# Patient Record
Sex: MALE | Race: WHITE | NOT HISPANIC OR LATINO | Employment: FULL TIME | URBAN - NONMETROPOLITAN AREA
[De-identification: names, ages, dates, MRNs, and addresses within clinical notes are randomized per-mention and may not be internally consistent; named-entity substitution may affect disease eponyms.]

---

## 2019-09-27 ENCOUNTER — ANESTHESIA (OUTPATIENT)
Dept: PERIOP | Facility: HOSPITAL | Age: 44
End: 2019-09-27

## 2019-09-27 ENCOUNTER — HOSPITAL ENCOUNTER (OUTPATIENT)
Facility: HOSPITAL | Age: 44
Setting detail: HOSPITAL OUTPATIENT SURGERY
Discharge: HOME | End: 2019-09-27
Attending: SURGERY | Admitting: SURGERY

## 2019-09-27 ENCOUNTER — OFFICE VISIT (OUTPATIENT)
Dept: SURGERY | Facility: CLINIC | Age: 44
End: 2019-09-27

## 2019-09-27 ENCOUNTER — ANESTHESIA EVENT (OUTPATIENT)
Dept: PERIOP | Facility: HOSPITAL | Age: 44
End: 2019-09-27

## 2019-09-27 VITALS
HEIGHT: 67 IN | WEIGHT: 223.55 LBS | DIASTOLIC BLOOD PRESSURE: 72 MMHG | SYSTOLIC BLOOD PRESSURE: 131 MMHG | TEMPERATURE: 97.5 F | HEART RATE: 99 BPM | RESPIRATION RATE: 18 BRPM | BODY MASS INDEX: 35.09 KG/M2 | OXYGEN SATURATION: 95 %

## 2019-09-27 VITALS
HEIGHT: 68 IN | BODY MASS INDEX: 33.95 KG/M2 | DIASTOLIC BLOOD PRESSURE: 80 MMHG | TEMPERATURE: 97.9 F | SYSTOLIC BLOOD PRESSURE: 130 MMHG | WEIGHT: 224 LBS

## 2019-09-27 DIAGNOSIS — L02.211 ABSCESS OF ABDOMINAL WALL: Primary | ICD-10-CM

## 2019-09-27 DIAGNOSIS — L02.211 ABSCESS OF ABDOMINAL WALL: ICD-10-CM

## 2019-09-27 LAB
ANION GAP SERPL CALCULATED.3IONS-SCNC: 11 MMOL/L (ref 5–15)
BUN BLD-MCNC: 16 MG/DL (ref 6–20)
BUN/CREAT SERPL: 19.3 (ref 7–25)
CALCIUM SPEC-SCNC: 9.2 MG/DL (ref 8.6–10.5)
CHLORIDE SERPL-SCNC: 103 MMOL/L (ref 98–107)
CO2 SERPL-SCNC: 25 MMOL/L (ref 22–29)
CREAT BLD-MCNC: 0.83 MG/DL (ref 0.76–1.27)
GFR SERPL CREATININE-BSD FRML MDRD: 100 ML/MIN/1.73
GLUCOSE BLD-MCNC: 158 MG/DL (ref 65–99)
GLUCOSE BLDC GLUCOMTR-MCNC: 146 MG/DL (ref 70–130)
POTASSIUM BLD-SCNC: 4 MMOL/L (ref 3.5–5.2)
SODIUM BLD-SCNC: 139 MMOL/L (ref 136–145)

## 2019-09-27 PROCEDURE — 87070 CULTURE OTHR SPECIMN AEROBIC: CPT | Performed by: SURGERY

## 2019-09-27 PROCEDURE — 88304 TISSUE EXAM BY PATHOLOGIST: CPT | Performed by: SURGERY

## 2019-09-27 PROCEDURE — 87205 SMEAR GRAM STAIN: CPT | Performed by: SURGERY

## 2019-09-27 PROCEDURE — 82962 GLUCOSE BLOOD TEST: CPT

## 2019-09-27 PROCEDURE — 93010 ELECTROCARDIOGRAM REPORT: CPT | Performed by: INTERNAL MEDICINE

## 2019-09-27 PROCEDURE — 11403 EXC TR-EXT B9+MARG 2.1-3CM: CPT | Performed by: SURGERY

## 2019-09-27 PROCEDURE — 93005 ELECTROCARDIOGRAM TRACING: CPT | Performed by: ANESTHESIOLOGY

## 2019-09-27 PROCEDURE — 25010000002 PROPOFOL 10 MG/ML EMULSION: Performed by: NURSE ANESTHETIST, CERTIFIED REGISTERED

## 2019-09-27 PROCEDURE — 80048 BASIC METABOLIC PNL TOTAL CA: CPT | Performed by: ANESTHESIOLOGY

## 2019-09-27 PROCEDURE — 88304 TISSUE EXAM BY PATHOLOGIST: CPT | Performed by: PATHOLOGY

## 2019-09-27 PROCEDURE — 99202 OFFICE O/P NEW SF 15 MIN: CPT | Performed by: SURGERY

## 2019-09-27 RX ORDER — MAGNESIUM HYDROXIDE 1200 MG/15ML
LIQUID ORAL AS NEEDED
Status: DISCONTINUED | OUTPATIENT
Start: 2019-09-27 | End: 2019-09-27 | Stop reason: HOSPADM

## 2019-09-27 RX ORDER — CEPHALEXIN 500 MG/1
500 CAPSULE ORAL 3 TIMES DAILY
COMMUNITY
Start: 2019-09-26 | End: 2019-10-07

## 2019-09-27 RX ORDER — PROPOFOL 10 MG/ML
VIAL (ML) INTRAVENOUS AS NEEDED
Status: DISCONTINUED | OUTPATIENT
Start: 2019-09-27 | End: 2019-09-27 | Stop reason: SURG

## 2019-09-27 RX ORDER — LIDOCAINE HYDROCHLORIDE AND EPINEPHRINE BITARTRATE 20; .01 MG/ML; MG/ML
INJECTION, SOLUTION SUBCUTANEOUS AS NEEDED
Status: DISCONTINUED | OUTPATIENT
Start: 2019-09-27 | End: 2019-09-27 | Stop reason: HOSPADM

## 2019-09-27 RX ORDER — BUPIVACAINE HCL/0.9 % NACL/PF 0.1 %
2 PLASTIC BAG, INJECTION (ML) EPIDURAL ONCE
Status: DISCONTINUED | OUTPATIENT
Start: 2019-09-27 | End: 2019-09-27 | Stop reason: HOSPADM

## 2019-09-27 RX ORDER — SODIUM CHLORIDE 9 MG/ML
100 INJECTION, SOLUTION INTRAVENOUS CONTINUOUS
Status: CANCELLED | OUTPATIENT
Start: 2019-09-27

## 2019-09-27 RX ORDER — ATORVASTATIN CALCIUM 40 MG/1
40 TABLET, FILM COATED ORAL NIGHTLY
COMMUNITY
Start: 2019-09-16 | End: 2020-10-23

## 2019-09-27 RX ORDER — LISINOPRIL 10 MG/1
10 TABLET ORAL DAILY
COMMUNITY
Start: 2019-09-16 | End: 2020-09-16

## 2019-09-27 RX ORDER — METFORMIN HYDROCHLORIDE 500 MG/1
500 TABLET, EXTENDED RELEASE ORAL
COMMUNITY
Start: 2019-09-17

## 2019-09-27 RX ORDER — SODIUM CHLORIDE 9 MG/ML
100 INJECTION, SOLUTION INTRAVENOUS CONTINUOUS
Status: DISCONTINUED | OUTPATIENT
Start: 2019-09-27 | End: 2019-09-27 | Stop reason: HOSPADM

## 2019-09-27 RX ORDER — BUPIVACAINE HYDROCHLORIDE 2.5 MG/ML
INJECTION, SOLUTION EPIDURAL; INFILTRATION; INTRACAUDAL AS NEEDED
Status: DISCONTINUED | OUTPATIENT
Start: 2019-09-27 | End: 2019-09-27 | Stop reason: HOSPADM

## 2019-09-27 RX ORDER — LIDOCAINE HYDROCHLORIDE 20 MG/ML
INJECTION, SOLUTION EPIDURAL; INFILTRATION; INTRACAUDAL; PERINEURAL AS NEEDED
Status: DISCONTINUED | OUTPATIENT
Start: 2019-09-27 | End: 2019-09-27 | Stop reason: SURG

## 2019-09-27 RX ORDER — HYDROCODONE BITARTRATE AND ACETAMINOPHEN 5; 325 MG/1; MG/1
1 TABLET ORAL EVERY 6 HOURS PRN
Qty: 20 TABLET | Refills: 0 | Status: SHIPPED | OUTPATIENT
Start: 2019-09-27 | End: 2020-10-23

## 2019-09-27 RX ORDER — CLOPIDOGREL BISULFATE 75 MG/1
75 TABLET ORAL DAILY
COMMUNITY
Start: 2019-09-16

## 2019-09-27 RX ADMIN — SODIUM CHLORIDE 100 ML/HR: 900 INJECTION, SOLUTION INTRAVENOUS at 15:48

## 2019-09-27 RX ADMIN — PROPOFOL 50 MG: 10 INJECTION, EMULSION INTRAVENOUS at 17:06

## 2019-09-27 RX ADMIN — PROPOFOL 50 MG: 10 INJECTION, EMULSION INTRAVENOUS at 16:50

## 2019-09-27 RX ADMIN — PROPOFOL 20 MG: 10 INJECTION, EMULSION INTRAVENOUS at 16:59

## 2019-09-27 RX ADMIN — PROPOFOL 50 MG: 10 INJECTION, EMULSION INTRAVENOUS at 17:02

## 2019-09-27 RX ADMIN — PROPOFOL 30 MG: 10 INJECTION, EMULSION INTRAVENOUS at 17:09

## 2019-09-27 RX ADMIN — LIDOCAINE HYDROCHLORIDE 100 MG: 20 INJECTION, SOLUTION EPIDURAL; INFILTRATION; INTRACAUDAL at 16:50

## 2019-09-27 RX ADMIN — SODIUM CHLORIDE: 900 INJECTION, SOLUTION INTRAVENOUS at 16:38

## 2019-09-27 NOTE — PROGRESS NOTES
"CHIEF COMPLAINT:    Abscess on abdominal wall    HISTORY OF PRESENT ILLNESS:    Yemi Fraga is a 44 y.o. male who presents with an abscess on his lower abdominal wall which has been present and worsening for approximately 1 week despite initiation of oral antibiotics previously.  He states that he has had small cutaneous \"pimples \"previously on his legs and abdominal wall and that he is always been able to pop them and they would go away.  He believes the spot began in that way about a week ago.  He tried to pop it.  It has had some spontaneous drainage but has worsened since he initially noticed it.  He has had constant pain in the area.    pertinent past medical history: diabetes, heart disease including prior MI, HLD    History reviewed. No pertinent surgical history.    Prior to Admission medications    Medication Sig Start Date End Date Taking? Authorizing Provider   atorvastatin (LIPITOR) 40 MG tablet  9/16/19  Yes Emilia Flores MD   cephalexin (KEFLEX) 500 MG capsule Take 500 mg by mouth 3 (Three) Times a Day. 9/26/19 10/7/19 Yes Emilia Flores MD   clopidogrel (PLAVIX) 75 MG tablet  9/16/19  Yes ProviderEmilia MD   lisinopril (PRINIVIL,ZESTRIL) 10 MG tablet Take 10 mg by mouth Daily. 9/16/19 9/16/20 Yes Emilia Flores MD   metFORMIN ER (GLUCOPHAGE-XR) 500 MG 24 hr tablet  9/17/19  Yes ProviderEmilia MD       No Known Allergies    History reviewed. No pertinent family history.    Social History     Socioeconomic History   • Marital status:      Spouse name: Not on file   • Number of children: Not on file   • Years of education: Not on file   • Highest education level: Not on file   Tobacco Use   • Smoking status: Current Every Day Smoker   • Smokeless tobacco: Never Used   Substance and Sexual Activity   • Alcohol use: No     Frequency: Never       Review of Systems   Constitutional: Negative for activity change, appetite change, chills and fever.   HENT: Negative " "for hearing loss, nosebleeds and trouble swallowing.    Cardiovascular: Negative for chest pain, palpitations and leg swelling.   Gastrointestinal: Negative for abdominal distention, abdominal pain, anal bleeding, blood in stool, constipation, diarrhea, nausea, rectal pain and vomiting.   Endocrine: Negative for cold intolerance, heat intolerance, polydipsia and polyuria.   Genitourinary: Positive for frequency. Negative for decreased urine volume, difficulty urinating, dysuria, enuresis, hematuria and urgency.   Musculoskeletal: Positive for arthralgias and back pain. Negative for gait problem, myalgias and neck pain.   Skin: Positive for wound. Negative for pallor and rash.   Allergic/Immunologic: Negative for immunocompromised state.   Neurological: Negative for dizziness, seizures, weakness, light-headedness, numbness and headaches.   Psychiatric/Behavioral: Negative for agitation and behavioral problems. The patient is not nervous/anxious.        Objective     /80   Temp 97.9 °F (36.6 °C) (Oral)   Ht 172.7 cm (68\")   Wt 102 kg (224 lb)   BMI 34.06 kg/m²     Physical Exam   Constitutional: He is oriented to person, place, and time. He appears well-developed and well-nourished.   HENT:   Head: Normocephalic and atraumatic.   Nose: Nose normal.   Eyes: Conjunctivae and EOM are normal. Right eye exhibits no discharge. Left eye exhibits no discharge.   Neck: Trachea normal, normal range of motion and phonation normal. Neck supple. No JVD present. No tracheal deviation and no edema present. No thyromegaly present.   Cardiovascular: Normal rate, regular rhythm and normal heart sounds. Exam reveals no gallop and no friction rub.   No murmur heard.  Pulmonary/Chest: Effort normal and breath sounds normal. No accessory muscle usage. No respiratory distress. He has no decreased breath sounds. He has no wheezes. He has no rales. He exhibits no tenderness.   Abdominal: Soft. He exhibits no distension, no fluid " wave, no ascites, no pulsatile midline mass and no mass. There is no tenderness. There is no rebound and no guarding. No hernia.       Musculoskeletal: Normal range of motion. He exhibits no edema, tenderness or deformity.   Lymphadenopathy:     He has no cervical adenopathy.        Left: No supraclavicular adenopathy present.   Neurological: He is alert and oriented to person, place, and time. He has normal strength. No cranial nerve deficit.   Skin: Skin is warm and dry. No rash noted. He is not diaphoretic. No erythema. No pallor.   Psychiatric: He has a normal mood and affect. His speech is normal and behavior is normal. Judgment and thought content normal. Cognition and memory are normal.   Vitals reviewed.        ASSESSMENT:    Abscess of abdominal wall    PLAN:    Owing to the size of the abscess as well as the surrounding cellulitis I believe the patient needs to have operative drainage of this area.  The risks and benefits of incision and drainage of abdominal wall abscess were discussed and he is agreeable to proceeding.  We will plan for OR this afternoon.  I discussed with him that given his diabetes and the amount of cellulitis he has he may need to be admitted to the hospital afterwards for a course of IV antibiotics as well.  We will make this determination following surgery.          This document has been electronically signed by Khris Crum MD on September 27, 2019 2:55 PM

## 2019-09-27 NOTE — ANESTHESIA POSTPROCEDURE EVALUATION
Patient: Yemi Fraga    Procedure Summary     Date:  09/27/19 Room / Location:  [facility] OR 09 / [facility] OR    Anesthesia Start:  1649 Anesthesia Stop:  1716    Procedure:  EXCISION of abdominal wall CYST (N/A ) Diagnosis:       Abscess of abdominal wall      (Abscess of abdominal wall [L02.211])    Surgeon:  Khris Crum MD Provider:  Saúl Perez MD    Anesthesia Type:  general ASA Status:  3 - Emergent          Anesthesia Type: general  Last vitals  BP   146/70 (09/27/19 1520)   Temp   97.6 °F (36.4 °C) (09/27/19 1520)   Pulse   75 (09/27/19 1520)   Resp   20 (09/27/19 1520)     SpO2   97 % (09/27/19 1520)     Post Anesthesia Care and Evaluation    Patient location during evaluation: bedside  Patient participation: complete - patient participated  Pain management: adequate  Airway patency: patent  Anesthetic complications: No anesthetic complications  PONV Status: none  Cardiovascular status: acceptable  Respiratory status: acceptable  Hydration status: acceptable

## 2019-09-27 NOTE — ANESTHESIA PREPROCEDURE EVALUATION
" Anesthesia Evaluation     Patient summary reviewed and Nursing notes reviewed   no history of anesthetic complications:  NPO Solid Status: Waived due to emergency  NPO Liquid Status: Waived due to emergency           Airway   Mallampati: III  TM distance: >3 FB  Neck ROM: full  possible difficult intubation  Dental    (+) poor dentation    Comment: Remaining upper and lower dentition in hopeless repair. Carious,chipped,cracked,missing;states none are \"loose\". Advised of potential loss and injury.    Pulmonary     breath sounds clear to auscultation  (+) a smoker Current Smoked day of surgery, COPD mild, decreased breath sounds,   Cardiovascular - normal exam    ECG reviewed  PT is on anticoagulation therapy  Rhythm: regular  Rate: normal    (+) hypertension, hyperlipidemia,   (-) murmur    ROS comment: EKG:NSR    Neuro/Psych- negative ROS  GI/Hepatic/Renal/Endo    (+) obesity,   diabetes mellitus type 2 poorly controlled,     Musculoskeletal (-) negative ROS    Abdominal   (+) obese,    Substance History - negative use     OB/GYN negative ob/gyn ROS         Other - negative ROS                       Anesthesia Plan    ASA 3 - emergent     MAC and general   total IV anesthesia and Rapid sequence  intravenous induction   Anesthetic plan, all risks, benefits, and alternatives have been provided, discussed and informed consent has been obtained with: patient and spouse/significant other.      "

## 2019-09-30 LAB
BACTERIA SPEC AEROBE CULT: NORMAL
GRAM STN SPEC: NORMAL

## 2019-10-01 DIAGNOSIS — Z00.00 GENERAL MEDICAL EXAM: Primary | ICD-10-CM

## 2019-10-01 LAB
LAB AP CASE REPORT: NORMAL
PATH REPORT.FINAL DX SPEC: NORMAL
PATH REPORT.GROSS SPEC: NORMAL

## 2019-10-02 ENCOUNTER — OFFICE VISIT (OUTPATIENT)
Dept: CARDIOLOGY | Facility: CLINIC | Age: 44
End: 2019-10-02

## 2019-10-02 VITALS
HEART RATE: 94 BPM | WEIGHT: 225 LBS | BODY MASS INDEX: 35.31 KG/M2 | DIASTOLIC BLOOD PRESSURE: 60 MMHG | SYSTOLIC BLOOD PRESSURE: 120 MMHG | OXYGEN SATURATION: 99 % | HEIGHT: 67 IN

## 2019-10-02 DIAGNOSIS — Z72.0 TOBACCO USE: ICD-10-CM

## 2019-10-02 DIAGNOSIS — I25.2 HISTORY OF ACUTE INFERIOR WALL MI: Primary | ICD-10-CM

## 2019-10-02 DIAGNOSIS — I25.10 CORONARY ARTERY DISEASE INVOLVING NATIVE CORONARY ARTERY OF NATIVE HEART WITHOUT ANGINA PECTORIS: ICD-10-CM

## 2019-10-02 DIAGNOSIS — E78.2 MIXED HYPERLIPIDEMIA: ICD-10-CM

## 2019-10-02 DIAGNOSIS — I10 ESSENTIAL HYPERTENSION: ICD-10-CM

## 2019-10-02 PROCEDURE — 93000 ELECTROCARDIOGRAM COMPLETE: CPT | Performed by: INTERNAL MEDICINE

## 2019-10-02 PROCEDURE — 99244 OFF/OP CNSLTJ NEW/EST MOD 40: CPT | Performed by: INTERNAL MEDICINE

## 2019-10-02 RX ORDER — ASPIRIN 81 MG/1
81 TABLET ORAL DAILY
COMMUNITY

## 2019-10-02 RX ORDER — LANCETS 33 GAUGE
EACH MISCELLANEOUS
COMMUNITY
Start: 2018-10-12

## 2019-10-02 NOTE — PROGRESS NOTES
Yemi Fraga  44 y.o. male    10/02/2019  1. History of acute inferior wall MI    2. Tobacco use    3. Coronary artery disease involving native coronary artery of native heart without angina pectoris    4. Essential hypertension    5. Mixed hyperlipidemia        History of Present Illness  Mr. 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  He was referred by his primary care physician.  His history is remarkable for known coronary artery disease and back in January 2014 underwent PCI to right coronary artery in an acute MI setting in [place], [place].  He had to LIMA placed at that time.  He was noted to have noncritical disease in the left anterior descending coronary artery, ramus intermedius coronary artery.  His risk factors for coronary artery disease include long-standing tobacco abuse, diabetes mellitus, hypertension, hyperlipidemia.  There is no significant family history of CAD.  The patient has done reasonably well since his intervention and has been compliant with his medications.  His blood pressure was in the normal range.  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  EKG today showed sinus rhythm with heart rate of 94 bpm.  Mild IVCD.  Old inferior wall myocardial infarction.  No signs of congestive heart failure was noted.    He recently underwent surgery for an abscess and abdominal wall sebaceous cyst which was successfully drained.    SUBJECTIVE    No Known Allergies      Past Medical History:   Diagnosis Date   • Diabetes mellitus (CMS/HCC)    • Hyperlipidemia    • Hypertension    • Myocardial infarction (CMS/HCC)     2014         Past Surgical History:   Procedure Laterality Date   • CARDIAC CATHETERIZATION      stent x 2         History reviewed. No pertinent family history.      Social History     Socioeconomic History  "  • Marital status:      Spouse name: Not on file   • Number of children: Not on file   • Years of education: Not on file   • Highest education level: Not on file   Tobacco Use   • Smoking status: Current Every Day Smoker     Packs/day: 2.00     Years: 30.00     Pack years: 60.00   • Smokeless tobacco: Never Used   Substance and Sexual Activity   • Alcohol use: No     Frequency: Never   • Drug use: No         Current Outpatient Medications   Medication Sig Dispense Refill   • aspirin 81 MG EC tablet Take 81 mg by mouth Daily.     • atorvastatin (LIPITOR) 40 MG tablet Take 40 mg by mouth Every Night.     • cephalexin (KEFLEX) 500 MG capsule Take 500 mg by mouth 3 (Three) Times a Day.     • clopidogrel (PLAVIX) 75 MG tablet Take 75 mg by mouth Daily.     • HYDROcodone-acetaminophen (NORCO) 5-325 MG per tablet Take 1 tablet by mouth Every 6 (Six) Hours As Needed (Pain). 20 tablet 0   • lisinopril (PRINIVIL,ZESTRIL) 10 MG tablet Take 10 mg by mouth Daily.     • metFORMIN ER (GLUCOPHAGE-XR) 500 MG 24 hr tablet Take 500 mg by mouth Daily With Breakfast.     • ONETOUCH DELICA LANCETS 33G misc        No current facility-administered medications for this visit.          OBJECTIVE    /60   Pulse 94   Ht 170.2 cm (67.01\")   Wt 102 kg (225 lb)   SpO2 99%   BMI 35.23 kg/m²         Review of Systems     Constitutional:  Denies recent weight loss, weight gain, fever or chills, no change in exercise tolerance     HENT:  Denies any hearing loss, epistaxis, hoarseness, or difficulty speaking.     Eyes: Wears eyeglasses or contact lenses     Respiratory: Occasional dyspnea with exertion,no cough, wheezing, or hemoptysis.     Cardiovascular: Negative for palpations, chest pain, orthopnea, PND, peripheral edema, syncope, or claudication.     Gastrointestinal:  Denies change in bowel habits, dyspepsia, ulcer disease, hematochezia, or melena.     Endocrine: Negative for cold intolerance, heat intolerance, polydipsia, " polyphagia and polyuria. Denies any history of weight change, heat/cold intolerance, polydipsia, polyuria     Genitourinary: Negative.      Musculoskeletal: Denies any history of arthritic symptoms or back problems     Skin:  Denies any change in hair or nails, rashes, or skin lesions. Recent surgery for infected sebaceous cyst    Allergic/Immunologic: Negative.  Negative for environmental allergies, food allergies and immunocompromised state.     Neurological:  Denies any history of recurrent headaches, strokes, TIA, or seizure disorder.     Hematological: Denies any food allergies, seasonal allergies, bleeding disorders, or lymphadenopathy.     Psychiatric/Behavioral: Denies any history of depression, substance abuse, or change in cognitive function.         Physical Exam     Constitutional: Cooperative, alert and oriented,  in no acute distress.     HENT:   Head: Normocephalic, normal hair patterns, no masses or tenderness.  Ears, Nose, and Throat: No gross abnormalities. No pallor or cyanosis.   Eyes: EOMS intact, PERRL, conjunctivae and lids unremarkable. Fundoscopic exam and visual fields not performed.   Neck: No palpable masses or adenopathy, no thyromegaly, no JVD, carotid pulses are full and equal bilaterally and without  Bruits.     Cardiovascular: Regular rhythm, S1 and S2 normal, no S3 or S4.No murmurs, gallops, or rubs detected.     Pulmonary/Chest: Chest: normal symmetry, normal respiratory excursion, no intercostal retraction, no use of accessory muscles.            Pulmonary: Normal breath sounds. No rales or ronchi.    Abdominal: Abdomen soft, bowel sounds normoactive, no masses, no hepatosplenomegaly, non-tender, no bruits.     Musculoskeletal: No deformities, clubbing, cyanosis, erythema, or edema observed. There are no spinal abnormalities noted. Normal muscle strength and tone. Pulses full and equal in all extremities, no bruits auscultated.     Neurological: No gross motor or sensory deficits  noted, affect appropriate, oriented to time, person, place.     Skin: Warm and dry to the touch, no apparent skin lesions or masses noted.     Psychiatric: He has a normal mood and affect. His behavior is normal. Judgment and thought content normal.         Procedures      Lab Results   Component Value Date    WBC 10.4 09/16/2019    HGB 17 (H) 09/16/2019    HCT 50.8 (H) 09/16/2019    MCV 91 09/16/2019     09/16/2019     Lab Results   Component Value Date    GLUCOSE 158 (H) 09/27/2019    BUN 16 09/27/2019    CREATININE 0.83 09/27/2019    EGFRIFNONA 100 09/27/2019    BCR 19.3 09/27/2019    CO2 25.0 09/27/2019    CALCIUM 9.2 09/27/2019     Lab Results   Component Value Date    CHOL 197 09/16/2019    CHOL 372 (H) 10/01/2018     Lab Results   Component Value Date    TRIG 479 (H) 09/16/2019    TRIG 1,799 (H) 10/01/2018     Lab Results   Component Value Date    HDL 18 (L) 09/16/2019    HDL 16 (L) 10/01/2018     No components found for: LDLCALC  Lab Results   Component Value Date    LDL 68 09/16/2019    LDL 90 10/01/2018     No results found for: HDLLDLRATIO  No components found for: CHOLHDL  Lab Results   Component Value Date    HGBA1C 6.5 (H) 09/16/2019     Lab Results   Component Value Date    TSH 1.39 10/01/2018           ASSESSMENT AND PLAN  Mr. Fraga has multiple risk factors for coronary artery disease including hypertension, diabetes mellitus, tobacco abuse and documented coronary artery disease with previous intervention to right coronary artery in an acute MI setting in January 2014.  He is here for a DOT physical.  He has mild NYHA class II dyspnea on exertion with no PND or orthopnea.  No chest pain is reported.  The plan will be to proceed with an exercise Cardiolite stress test to assess myocardial perfusion and an echocardiogram to assess left ventricular and valvular function has been arranged.  Antiplatelet therapy with aspirin and Plavix, lipid-lowering therapy with atorvastatin, antihypertensive  therapy with lisinopril has been continued.  Aggressive risk factor modification and smoking cessation was stressed.    Addendum on 10/15/2019  Echocardiogram showed:  · The left ventricular cavity is borderline dilated.  · Left ventricular wall thickness is consistent with mild-to-moderate concentric hypertrophy.  · Estimated EF = 48%. Mild Inferior wall hypokinesis  · Left ventricular systolic function is low normal.  · Left ventricular diastolic dysfunction (grade I) consistent with impaired relaxation.     Exercise Cardiolite stress test showed:  · Patient exercised for 7 minutes and 15 seconds of the [place] protocol to achieve a maximum workload of 8.( METS). No chest pain reported during the test. No ST-T wave changes diagnostic of ischemia. Accelerated blood pressure response. Patient achieved 93% of maximal age-predicted heart rate.  · Left ventricular ejection fraction is mildly reduced (Calculated EF = 45%).  · Myocardial perfusion imaging indicates a small-to-medium-sized infarct located in the inferior wall and lateral wall with no significant ischemia noted.     Mr. 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    Yemi was seen today for new patient.    Diagnoses and all orders for this visit:    History of acute inferior wall MI  -     Adult Transthoracic Echo Complete W/ Cont if Necessary Per Protocol; Future  -     Stress Test With Myocardial Perfusion One Day; Future    Tobacco use    Coronary artery disease involving native coronary artery of native heart without angina pectoris  -     Adult Transthoracic Echo Complete W/ Cont if Necessary Per Protocol; Future  -     Stress Test With Myocardial Perfusion One Day; Future    Essential hypertension  -     Adult Transthoracic Echo Complete W/ Cont if Necessary Per Protocol; Future  -     Stress Test With Myocardial Perfusion One Day; Future    Mixed hyperlipidemia  -      Adult Transthoracic Echo Complete W/ Cont if Necessary Per Protocol; Future  -     Stress Test With Myocardial Perfusion One Day; Future        Patient's Body mass index is 35.23 kg/m². BMI is above normal parameters. Recommendations include: exercise counseling and nutrition counseling.      Yemi Fraga is a current cigarettes user.  He currently smokes 1 pack of cigarettes per day for a duration of 35 years. I have educated him on the risk of diseases from using tobacco products such as cancer, COPD and heart diease.     I spent 3  minutes counseling the patient.          Ruben Alvarez MD  10/2/2019  9:10 AM

## 2019-10-03 ENCOUNTER — OFFICE VISIT (OUTPATIENT)
Dept: SURGERY | Facility: CLINIC | Age: 44
End: 2019-10-03

## 2019-10-03 VITALS
TEMPERATURE: 97.5 F | WEIGHT: 225.4 LBS | HEIGHT: 67 IN | BODY MASS INDEX: 35.38 KG/M2 | DIASTOLIC BLOOD PRESSURE: 70 MMHG | HEART RATE: 97 BPM | SYSTOLIC BLOOD PRESSURE: 136 MMHG

## 2019-10-03 DIAGNOSIS — Z09 FOLLOW UP: Primary | ICD-10-CM

## 2019-10-03 PROCEDURE — 99024 POSTOP FOLLOW-UP VISIT: CPT | Performed by: SURGERY

## 2019-10-03 NOTE — PROGRESS NOTES
CHIEF COMPLAINT:    Chief Complaint   Patient presents with   • Post-op Follow-up     P.O. Exc. of Infected abd. wall sebaceous cyst 9-27-19.       HISTORY OF PRESENT ILLNESS:    Yemi Fraga is a 44 y.o. male who underwent excision of an infected sebaceous cyst from the abdominal wall on 9/27/2019.  He returns today for follow-up.  He has no complaints.  His wife has been doing his dressing changes at home.    EXAM:  Vitals:    10/03/19 1401   BP: 136/70   Pulse: 97   Temp: 97.5 °F (36.4 °C)         Healing wound in right lower abdominal wall.  Wet-to-dry dressing placed today.    ASSESSMENT:    Status post excision of infected cyst from abdominal wall    PLAN:    Overall appears to be healing appropriately.  Continue local wound care.  Follow-up in 2 weeks.          This document has been electronically signed by Khris Crum MD on October 3, 2019 2:30 PM

## 2019-10-09 ENCOUNTER — HOSPITAL ENCOUNTER (OUTPATIENT)
Dept: NUCLEAR MEDICINE | Facility: HOSPITAL | Age: 44
Discharge: HOME | End: 2019-10-09

## 2019-10-09 ENCOUNTER — HOSPITAL ENCOUNTER (OUTPATIENT)
Dept: CARDIOLOGY | Facility: HOSPITAL | Age: 44
Discharge: HOME | End: 2019-10-09

## 2019-10-09 DIAGNOSIS — I10 ESSENTIAL HYPERTENSION: ICD-10-CM

## 2019-10-09 DIAGNOSIS — I25.2 HISTORY OF ACUTE INFERIOR WALL MI: ICD-10-CM

## 2019-10-09 DIAGNOSIS — E78.2 MIXED HYPERLIPIDEMIA: ICD-10-CM

## 2019-10-09 DIAGNOSIS — I25.10 CORONARY ARTERY DISEASE INVOLVING NATIVE CORONARY ARTERY OF NATIVE HEART WITHOUT ANGINA PECTORIS: ICD-10-CM

## 2019-10-09 LAB
BH CV STRESS BP STAGE 1: NORMAL
BH CV STRESS BP STAGE 2: NORMAL
BH CV STRESS DURATION MIN STAGE 1: 3
BH CV STRESS DURATION MIN STAGE 2: 3
BH CV STRESS DURATION MIN STAGE 3: 1
BH CV STRESS DURATION SEC STAGE 1: 0
BH CV STRESS DURATION SEC STAGE 2: 0
BH CV STRESS DURATION SEC STAGE 3: 16
BH CV STRESS GRADE STAGE 1: 10
BH CV STRESS GRADE STAGE 2: 12
BH CV STRESS GRADE STAGE 3: 14
BH CV STRESS HR STAGE 1: 127
BH CV STRESS HR STAGE 2: 144
BH CV STRESS HR STAGE 3: 162
BH CV STRESS METS STAGE 1: 5
BH CV STRESS METS STAGE 2: 7.5
BH CV STRESS METS STAGE 3: 10
BH CV STRESS PROTOCOL 1: NORMAL
BH CV STRESS RECOVERY BP: NORMAL MMHG
BH CV STRESS RECOVERY HR: 107 BPM
BH CV STRESS SPEED STAGE 1: 1.7
BH CV STRESS SPEED STAGE 2: 2.5
BH CV STRESS SPEED STAGE 3: 3.4
BH CV STRESS STAGE 1: 1
BH CV STRESS STAGE 2: 2
BH CV STRESS STAGE 3: 3
LV EF NUC BP: 45 %
MAXIMAL PREDICTED HEART RATE: 174 BPM
PERCENT MAX PREDICTED HR: 93.1 %
STRESS BASELINE BP: NORMAL MMHG
STRESS BASELINE HR: 95 BPM
STRESS PERCENT HR: 110 %
STRESS POST ESTIMATED WORKLOAD: 8.9 METS
STRESS POST EXERCISE DUR MIN: 7 MIN
STRESS POST EXERCISE DUR SEC: 15 SEC
STRESS POST PEAK BP: NORMAL MMHG
STRESS POST PEAK HR: 162 BPM
STRESS TARGET HR: 148 BPM

## 2019-10-09 PROCEDURE — 78452 HT MUSCLE IMAGE SPECT MULT: CPT | Performed by: INTERNAL MEDICINE

## 2019-10-09 PROCEDURE — 93017 CV STRESS TEST TRACING ONLY: CPT

## 2019-10-09 PROCEDURE — 93018 CV STRESS TEST I&R ONLY: CPT | Performed by: INTERNAL MEDICINE

## 2019-10-09 PROCEDURE — 0 TECHNETIUM SESTAMIBI: Performed by: INTERNAL MEDICINE

## 2019-10-09 PROCEDURE — A9500 TC99M SESTAMIBI: HCPCS | Performed by: INTERNAL MEDICINE

## 2019-10-09 PROCEDURE — 78452 HT MUSCLE IMAGE SPECT MULT: CPT

## 2019-10-09 PROCEDURE — 93016 CV STRESS TEST SUPVJ ONLY: CPT | Performed by: INTERNAL MEDICINE

## 2019-10-09 RX ADMIN — TECHNETIUM TC 99M SESTAMIBI 1 DOSE: 1 INJECTION INTRAVENOUS at 10:22

## 2019-10-09 RX ADMIN — TECHNETIUM TC 99M SESTAMIBI 1 DOSE: 1 INJECTION INTRAVENOUS at 08:32

## 2019-10-15 ENCOUNTER — DOCUMENTATION (OUTPATIENT)
Dept: CARDIOLOGY | Facility: CLINIC | Age: 44
End: 2019-10-15

## 2019-10-17 ENCOUNTER — OFFICE VISIT (OUTPATIENT)
Dept: SURGERY | Facility: CLINIC | Age: 44
End: 2019-10-17

## 2019-10-17 VITALS
DIASTOLIC BLOOD PRESSURE: 74 MMHG | WEIGHT: 230 LBS | SYSTOLIC BLOOD PRESSURE: 116 MMHG | TEMPERATURE: 97.7 F | HEIGHT: 67 IN | BODY MASS INDEX: 36.1 KG/M2 | HEART RATE: 93 BPM

## 2019-10-17 DIAGNOSIS — Z09 FOLLOW UP: Primary | ICD-10-CM

## 2019-10-17 PROCEDURE — 99212 OFFICE O/P EST SF 10 MIN: CPT | Performed by: SURGERY

## 2019-10-17 NOTE — PROGRESS NOTES
CHIEF COMPLAINT:    Chief Complaint   Patient presents with   • Follow-up     S/P Excision of infected abdominal wall sebaceous cyst on 9/27/19.       HISTORY OF PRESENT ILLNESS:    Yemi Fraga is a 44 y.o. male who underwent prior I&D of an abdominal wall abscess with removal of an infected sebaceous cyst.  He has been undergoing local wound care at home for this.  He notes no complaints today.    EXAM:  Vitals:    10/17/19 1050   BP: 116/74   Pulse: 93   Temp: 97.7 °F (36.5 °C)         Granulating wound in right lower abdomen.  Wet-to-dry dressing placed today.    ASSESSMENT:    Status post drainage of infected cyst    PLAN:    Continue local wound care may return to work without restriction.  Follow-up in 3 to 4 weeks.          This document has been electronically signed by Khris Crum MD on October 17, 2019 11:10 AM

## 2019-11-15 ENCOUNTER — OFFICE VISIT (OUTPATIENT)
Dept: SURGERY | Facility: CLINIC | Age: 44
End: 2019-11-15

## 2019-11-15 VITALS
WEIGHT: 230 LBS | BODY MASS INDEX: 36.1 KG/M2 | HEIGHT: 67 IN | SYSTOLIC BLOOD PRESSURE: 124 MMHG | DIASTOLIC BLOOD PRESSURE: 70 MMHG | HEART RATE: 95 BPM | TEMPERATURE: 97.9 F

## 2019-11-15 DIAGNOSIS — Z09 FOLLOW UP: Primary | ICD-10-CM

## 2019-11-15 PROCEDURE — 99024 POSTOP FOLLOW-UP VISIT: CPT | Performed by: SURGERY

## 2019-11-15 NOTE — PROGRESS NOTES
CHIEF COMPLAINT:    Chief Complaint   Patient presents with   • Follow-up     S/P Excision of infected abdominal wall sebaceous cyst on 9/27/19.       HISTORY OF PRESENT ILLNESS:    Yemi Fraga is a 44 y.o. male who underwent drainage of an infected sebaceous cyst on the abdominal wall previously.  He returns today for additional follow-up.  He reports no complaints or concerns today.  He has been washing the area regularly with soap and water.  He does state that he has been picking at the scabbing in this area.    EXAM:  Vitals:    11/15/19 0928   BP: 124/70   Pulse: 95   Temp: 97.9 °F (36.6 °C)         Healing right lower quadrant abdominal wound with granulation tissue and scabbing    ASSESSMENT:    Status post drainage of infected cyst    PLAN:    Appears to be healing appropriately.  Continue local wound care.  See me as needed.          This document has been electronically signed by Khris Crum MD on November 15, 2019 9:36 AM

## 2020-04-21 ENCOUNTER — OFFICE VISIT (OUTPATIENT)
Dept: CARDIOLOGY | Facility: CLINIC | Age: 45
End: 2020-04-21

## 2020-04-21 VITALS — BODY MASS INDEX: 36.1 KG/M2 | WEIGHT: 230 LBS | HEIGHT: 67 IN

## 2020-04-21 DIAGNOSIS — Z72.0 TOBACCO USE: ICD-10-CM

## 2020-04-21 DIAGNOSIS — I10 ESSENTIAL HYPERTENSION: ICD-10-CM

## 2020-04-21 DIAGNOSIS — I25.10 CORONARY ARTERY DISEASE INVOLVING NATIVE CORONARY ARTERY OF NATIVE HEART WITHOUT ANGINA PECTORIS: ICD-10-CM

## 2020-04-21 DIAGNOSIS — E78.2 MIXED HYPERLIPIDEMIA: Primary | ICD-10-CM

## 2020-04-21 PROCEDURE — 99442 PR PHYS/QHP TELEPHONE EVALUATION 11-20 MIN: CPT | Performed by: INTERNAL MEDICINE

## 2020-04-21 NOTE — PROGRESS NOTES
Yemi Fraga  45 y.o. male      1. Mixed hyperlipidemia    2. Essential hypertension    3. Tobacco use    4. Coronary artery disease involving native coronary artery of native heart without angina pectoris        History of Present Illness:  This visit has been rescheduled as a phone visit to comply with patient safety concerns in accordance with CDC recommendations. Total time of discussion was 20 minutes.    You have chosen to receive care through a telephone visit. Do you consent to use a telephone visit for your medical care today? Yes    Mr. 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    His history is remarkable for known coronary artery disease and back in January 2014 underwent PCI to right coronary artery in an acute MI setting in [place], [place].  He had to LIMA placed at that time.  He was noted to have noncritical disease in the left anterior descending coronary artery, ramus intermedius coronary artery.  His risk factors for coronary artery disease include long-standing tobacco abuse, diabetes mellitus, hypertension, hyperlipidemia.  There is no significant family history of CAD.    The patient has done well and has been compliant with his medications. 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  He was at work and did not have access to a blood pressure cuff.  He denied any cardiac symptoms.  He admitted to not watching his diet closely and will a discussion about appropriate diet.    In 10/ 2019:  Echocardiogram showed:  · The left ventricular cavity is borderline dilated.  · Left ventricular wall thickness is consistent with mild-to-moderate concentric hypertrophy.  · Estimated EF = 48%. Mild Inferior wall hypokinesis  · Left ventricular systolic function is low normal.  · Left ventricular diastolic  dysfunction (grade I) consistent with impaired relaxation.     Exercise Cardiolite stress test showed:  · Patient exercised for 7 minutes and 15 seconds of the Pastor protocol to achieve a maximum workload of 8.( METS). No chest pain reported during the test. No ST-T wave changes diagnostic of ischemia. Accelerated blood pressure response. Patient achieved 93% of maximal age-predicted heart rate.  · Left ventricular ejection fraction is mildly reduced (Calculated EF = 45%).  · Myocardial perfusion imaging indicates a small-to-medium-sized infarct located in the inferior wall and lateral wall with no significant ischemia noted.      No Known Allergies      Past Medical History:   Diagnosis Date   • Diabetes mellitus (CMS/HCC)    • Hyperlipidemia    • Hypertension    • Myocardial infarction (CMS/HCC)     2014         Past Surgical History:   Procedure Laterality Date   • CARDIAC CATHETERIZATION      stent x 2   • INCISION AND DRAINAGE ABSCESS N/A 9/27/2019    Procedure: EXCISION of abdominal wall CYST;  Surgeon: Khris Crum MD;  Location: [facility];  Service: General         History reviewed. No pertinent family history.      Social History     Socioeconomic History   • Marital status:      Spouse name: Not on file   • Number of children: Not on file   • Years of education: Not on file   • Highest education level: Not on file   Tobacco Use   • Smoking status: Current Every Day Smoker     Packs/day: 2.00     Years: 30.00     Pack years: 60.00   • Smokeless tobacco: Never Used   Substance and Sexual Activity   • Alcohol use: No     Frequency: Never   • Drug use: No         Current Outpatient Medications   Medication Sig Dispense Refill   • aspirin 81 MG EC tablet Take 81 mg by mouth Daily.     • atorvastatin (LIPITOR) 40 MG tablet Take 40 mg by mouth Every Night.     • clopidogrel (PLAVIX) 75 MG tablet Take 75 mg by mouth Daily.     • lisinopril (PRINIVIL,ZESTRIL) 10 MG tablet Take 10 mg by mouth  "Daily.     • metFORMIN ER (GLUCOPHAGE-XR) 500 MG 24 hr tablet Take 500 mg by mouth Daily With Breakfast.     • ONETOUCH DELICA LANCETS 33G misc      • HYDROcodone-acetaminophen (NORCO) 5-325 MG per tablet Take 1 tablet by mouth Every 6 (Six) Hours As Needed (Pain). 20 tablet 0     No current facility-administered medications for this visit.          OBJECTIVE    Ht 170.2 cm (67\")   Wt 104 kg (230 lb)   BMI 36.02 kg/m²         Review of Systems     Constitutional:  Denies recent weight loss, weight gain, fever or chills, no change in exercise tolerance     HENT:  Denies any hearing loss, epistaxis, hoarseness, or difficulty speaking.     Eyes: Wears eyeglasses or contact lenses     Respiratory: Occasional dyspnea with exertion,no cough, wheezing, or hemoptysis.     Cardiovascular: Negative for palpations, chest pain, orthopnea, PND, peripheral edema, syncope, or claudication.     Gastrointestinal:  Denies change in bowel habits, dyspepsia, ulcer disease, hematochezia, or melena.     Endocrine: Negative for cold intolerance, heat intolerance, polydipsia, polyphagia and polyuria. Denies any history of weight change, heat/cold intolerance, polydipsia, polyuria     Genitourinary: Negative.      Musculoskeletal: Denies any history of arthritic symptoms or back problems     Skin:  Denies any change in hair or nails, rashes, or skin lesions. Recent surgery for infected sebaceous cyst    Allergic/Immunologic: Negative.  Negative for environmental allergies, food allergies and immunocompromised state.     Neurological:  Denies any history of recurrent headaches, strokes, TIA, or seizure disorder.     Hematological: Denies any food allergies, seasonal allergies, bleeding disorders, or lymphadenopathy.     Psychiatric/Behavioral: Denies any history of depression, substance abuse, or change in cognitive function.         Lab Results   Component Value Date    WBC 10.4 09/16/2019    HGB 17 (H) 09/16/2019    HCT 50.8 (H) " 09/16/2019    MCV 91 09/16/2019     09/16/2019     Lab Results   Component Value Date    GLUCOSE 158 (H) 09/27/2019    BUN 16 09/27/2019    CREATININE 0.83 09/27/2019    EGFRIFNONA 100 09/27/2019    BCR 19.3 09/27/2019    CO2 25.0 09/27/2019    CALCIUM 9.2 09/27/2019     Lab Results   Component Value Date    CHOL 197 09/16/2019    CHOL 372 (H) 10/01/2018     Lab Results   Component Value Date    TRIG 479 (H) 09/16/2019    TRIG 1,799 (H) 10/01/2018     Lab Results   Component Value Date    HDL 18 (L) 09/16/2019    HDL 16 (L) 10/01/2018     No components found for: LDLCALC  Lab Results   Component Value Date    LDL 68 09/16/2019    LDL 90 10/01/2018     No results found for: HDLLDLRATIO  No components found for: CHOLHDL  Lab Results   Component Value Date    HGBA1C 6.5 (H) 09/16/2019     Lab Results   Component Value Date    TSH 1.39 10/01/2018           ASSESSMENT AND PLAN  Mr. Fraga has multiple risk factors for coronary artery disease including hypertension, diabetes mellitus, tobacco abuse and documented coronary artery disease with previous intervention to right coronary artery in an acute MI setting in January 2014.  Antiplatelet therapy with aspirin and Plavix, lipid-lowering therapy with atorvastatin, antihypertensive therapy with lisinopril has been continued.  Aggressive risk factor modification and smoking cessation was stressed.  He will be seeing his primary care physician soon and will have lipid profile checked.  We could consider adding fenofibrate if his triglycerides are still elevated.  He has been advised to watch his diet closely    Yemi was seen today for follow-up.    Diagnoses and all orders for this visit:    Mixed hyperlipidemia    Essential hypertension    Tobacco use    Coronary artery disease involving native coronary artery of native heart without angina pectoris        Patient's Body mass index is 36.02 kg/m². BMI is above normal parameters. Recommendations include: exercise  counseling and nutrition counseling.      Yemi Fraga is a current cigarettes user.  He currently smokes 1 pack of cigarettes per day for a duration of 35 years. I have educated him on the risk of diseases from using tobacco products such as cancer, COPD and heart diease.     About 20 minutes was spent in the assessment and documentation of this patient    Ruben Alvarez MD  4/21/2020  10:37

## 2020-10-23 ENCOUNTER — OFFICE VISIT (OUTPATIENT)
Dept: CARDIOLOGY | Facility: CLINIC | Age: 45
End: 2020-10-23

## 2020-10-23 VITALS
HEART RATE: 93 BPM | HEIGHT: 67 IN | BODY MASS INDEX: 34.4 KG/M2 | OXYGEN SATURATION: 98 % | WEIGHT: 219.2 LBS | DIASTOLIC BLOOD PRESSURE: 78 MMHG | SYSTOLIC BLOOD PRESSURE: 132 MMHG

## 2020-10-23 DIAGNOSIS — Z72.0 TOBACCO USE: ICD-10-CM

## 2020-10-23 DIAGNOSIS — I25.10 CORONARY ARTERY DISEASE INVOLVING NATIVE CORONARY ARTERY OF NATIVE HEART WITHOUT ANGINA PECTORIS: ICD-10-CM

## 2020-10-23 DIAGNOSIS — E78.2 MIXED HYPERLIPIDEMIA: Primary | ICD-10-CM

## 2020-10-23 DIAGNOSIS — I10 ESSENTIAL HYPERTENSION: ICD-10-CM

## 2020-10-23 PROCEDURE — 93000 ELECTROCARDIOGRAM COMPLETE: CPT | Performed by: INTERNAL MEDICINE

## 2020-10-23 PROCEDURE — 99214 OFFICE O/P EST MOD 30 MIN: CPT | Performed by: INTERNAL MEDICINE

## 2020-10-23 RX ORDER — ATORVASTATIN CALCIUM 80 MG/1
80 TABLET, FILM COATED ORAL NIGHTLY
Qty: 90 TABLET | Refills: 3
Start: 2020-10-23

## 2020-10-23 RX ORDER — FENOFIBRATE 145 MG/1
145 TABLET, COATED ORAL DAILY
Qty: 90 TABLET | Refills: 3 | Status: SHIPPED | OUTPATIENT
Start: 2020-10-23 | End: 2022-07-01

## 2020-10-23 NOTE — PROGRESS NOTES
Yemi Fraga  45 y.o. male      1. Mixed hyperlipidemia    2. Coronary artery disease involving native coronary artery of native heart without angina pectoris    3. Essential hypertension    4. Tobacco use        History of Present Illness:  Mr. Fraga is a 45-year-old  male with known coronary artery disease and back in January 2014 underwent PCI to right coronary artery in an acute MI setting in [place], [place].  He had to LIMA placed at that time.  He was noted to have noncritical disease in the left anterior descending coronary artery, ramus intermedius coronary artery.  His risk factors for coronary artery disease include long-standing tobacco abuse, diabetes mellitus, hypertension, hyperlipidemia.  There is no significant family history of CAD.    Echocardiogram showed: (10/19)  · The left ventricular cavity is borderline dilated.  · Left ventricular wall thickness is consistent with mild-to-moderate concentric hypertrophy.  · Estimated EF = 48%. Mild Inferior wall hypokinesis  · Left ventricular systolic function is low normal.  · Left ventricular diastolic dysfunction (grade I) consistent with impaired relaxation.     Exercise Cardiolite stress test showed:(10/19)  · Patient exercised for 7 minutes and 15 seconds of the Pastor protocol to achieve a maximum workload of 8.( METS). No chest pain reported during the test. No ST-T wave changes diagnostic of ischemia. Accelerated blood pressure response. Patient achieved 93% of maximal age-predicted heart rate.  · Left ventricular ejection fraction is mildly reduced (Calculated EF = 45%).  · Myocardial perfusion imaging indicates a small-to-medium-sized infarct located in the inferior wall and lateral wall with no significant ischemia noted.    EKG today showed sinus rhythm with heart rate of 90 bpm.  Old inferior wall myocardial infarction.    The patient denies any chest pain or shortness of breath and I note that his dose of atorvastatin was increased to  80 mg daily since his total cholesterol was 248.  His triglycerides were 656.  The patient claims to be watching his diet but this is not reflected in his lab results.  He unfortunately continues to smoke and we once again had a discussion about smoking cessation.  He is unlikely to quit.  Clinical exam today was unremarkable.  No signs of congestive heart failure was noted.      No Known Allergies      Past Medical History:   Diagnosis Date   • Diabetes mellitus (CMS/HCC)    • Hyperlipidemia    • Hypertension    • Myocardial infarction (CMS/HCC)     2014         Past Surgical History:   Procedure Laterality Date   • CARDIAC CATHETERIZATION      stent x 2   • INCISION AND DRAINAGE ABSCESS N/A 9/27/2019    Procedure: EXCISION of abdominal wall CYST;  Surgeon: Khris Crum MD;  Location: [facility];  Service: General         History reviewed. No pertinent family history.      Social History     Socioeconomic History   • Marital status:      Spouse name: Not on file   • Number of children: Not on file   • Years of education: Not on file   • Highest education level: Not on file   Tobacco Use   • Smoking status: Current Every Day Smoker     Packs/day: 2.00     Years: 30.00     Pack years: 60.00   • Smokeless tobacco: Never Used   Substance and Sexual Activity   • Alcohol use: No     Frequency: Never   • Drug use: No         Current Outpatient Medications   Medication Sig Dispense Refill   • aspirin 81 MG EC tablet Take 81 mg by mouth Daily.     • atorvastatin (LIPITOR) 40 MG tablet Take 40 mg by mouth Every Night.     • clopidogrel (PLAVIX) 75 MG tablet Take 75 mg by mouth Daily.     • metFORMIN ER (GLUCOPHAGE-XR) 500 MG 24 hr tablet Take 500 mg by mouth Daily With Breakfast.     • ONETOUCH DELICA LANCETS 33G misc        No current facility-administered medications for this visit.          OBJECTIVE    /78 (BP Location: Left arm, Patient Position: Sitting, Cuff Size: Adult)   Pulse 93   Ht 170.2  "cm (67\")   Wt 99.4 kg (219 lb 3.2 oz)   SpO2 98%   BMI 34.33 kg/m²         Review of Systems     Constitutional:  Denies recent weight loss, weight gain, fever or chills, no change in exercise tolerance     HENT:  Denies any hearing loss, epistaxis, hoarseness, or difficulty speaking.     Eyes: Wears eyeglasses or contact lenses     Respiratory: Occasional dyspnea with exertion,no cough, wheezing, or hemoptysis.     Cardiovascular: Negative for palpations, chest pain, orthopnea, PND, peripheral edema, syncope, or claudication.     Gastrointestinal:  Denies change in bowel habits, dyspepsia, ulcer disease, hematochezia, or melena.     Endocrine: Negative for cold intolerance, heat intolerance, polydipsia, polyphagia and polyuria. Denies any history of weight change, heat/cold intolerance, polydipsia, polyuria     Genitourinary: Negative.      Musculoskeletal: Denies any history of arthritic symptoms or back problems     Skin:  Denies any change in hair or nails, rashes, or skin lesions. Recent surgery for infected sebaceous cyst    Allergic/Immunologic: Negative.  Negative for environmental allergies, food allergies and immunocompromised state.     Neurological:  Denies any history of recurrent headaches, strokes, TIA, or seizure disorder.     Hematological: Denies any food allergies, seasonal allergies, bleeding disorders, or lymphadenopathy.     Psychiatric/Behavioral: Denies any history of depression, substance abuse, or change in cognitive function.     Physical Exam      Constitutional: Cooperative, alert and oriented,  in no acute distress.      HENT:   Head: Normocephalic, normal hair patterns, no masses or tenderness.  Ears, Nose, and Throat: No gross abnormalities. No pallor or cyanosis.   Eyes: EOMS intact, PERRL, conjunctivae and lids unremarkable. Fundoscopic exam and visual fields not performed.   Neck: No palpable masses or adenopathy, no thyromegaly, no JVD, carotid pulses are full and equal " bilaterally and without  Bruits.      Cardiovascular: Regular rhythm, S1 and S2 normal, no S3 or S4.No murmurs, gallops, or rubs detected.      Pulmonary/Chest: Chest: normal symmetry, normal respiratory excursion, no intercostal retraction, no use of accessory muscles.                                  Pulmonary: Normal breath sounds. No rales or ronchi.     Abdominal: Abdomen soft, bowel sounds normoactive, no masses, no hepatosplenomegaly, non-tender, no bruits.     Musculoskeletal: No deformities, clubbing, cyanosis, erythema, or edema observed. There are no spinal abnormalities noted. Normal muscle strength and tone. Pulses full and equal in all extremities, no bruits auscultated.     Neurological: No gross motor or sensory deficits noted, affect appropriate, oriented to time, person, place.      Skin: Warm and dry to the touch, no apparent skin lesions or masses noted.     Psychiatric: He has a normal mood and affect. His behavior is normal. Judgment and thought content normal.           Lab Results   Component Value Date    WBC 10.4 09/16/2019    HGB 17 (H) 09/16/2019    HCT 50.8 (H) 09/16/2019    MCV 91 09/16/2019     09/16/2019     Lab Results   Component Value Date    GLUCOSE 158 (H) 09/27/2019    BUN 16 09/27/2019    CREATININE 0.83 09/27/2019    EGFRIFNONA 100 09/27/2019    BCR 19.3 09/27/2019    CO2 25.0 09/27/2019    CALCIUM 9.2 09/27/2019     Lab Results   Component Value Date    CHOL 248 (H) 08/24/2020    CHOL 197 09/16/2019    CHOL 372 (H) 10/01/2018     Lab Results   Component Value Date    TRIG 656 (H) 08/24/2020    TRIG 479 (H) 09/16/2019    TRIG 1,799 (H) 10/01/2018     Lab Results   Component Value Date    HDL 16 (L) 08/24/2020    HDL 18 (L) 09/16/2019    HDL 16 (L) 10/01/2018     No components found for: LDLCALC  Lab Results   Component Value Date    LDL 65 08/24/2020    LDL 68 09/16/2019    LDL 90 10/01/2018     No results found for: HDLLDLRATIO  No components found for: CHOLHDL  Lab  Results   Component Value Date    HGBA1C 7.6 (H) 08/24/2020     Lab Results   Component Value Date    TSH 1.39 10/01/2018           ASSESSMENT AND PLAN  Mr. Fraga has multiple risk factors for coronary artery disease including hypertension, diabetes mellitus, tobacco abuse and documented coronary artery disease with previous intervention to right coronary artery in an acute MI setting in January 2014.  Antiplatelet therapy with aspirin and Plavix, lipid-lowering therapy with atorvastatin has been continued and I have started him on fenofibrate 145 mg daily for optimization of triglycerides.  I did bring up starting Vascepa which has excellent data but he does not wish to take fish oils.    Aggressive risk factor modification and smoking cessation was stressed.     Diagnoses and all orders for this visit:    1. Mixed hyperlipidemia (Primary)  -     ECG 12 Lead    2. Coronary artery disease involving native coronary artery of native heart without angina pectoris    3. Essential hypertension    4. Tobacco use        Patient's Body mass index is 34.33 kg/m². BMI is above normal parameters. Recommendations include: exercise counseling and nutrition counseling.      Yemi Fraga is a current cigarettes user.  He currently smokes 1 pack of cigarettes per day for a duration of 35 years. I have educated him on the risk of diseases from using tobacco products such as cancer, COPD and heart diease.   Smoking cessation was stressed.  He is unlikely to quit.    Ruben Alvarez MD  10/23/2020  09:33 CDT

## 2022-07-01 ENCOUNTER — LAB (OUTPATIENT)
Dept: LAB | Facility: HOSPITAL | Age: 47
End: 2022-07-01

## 2022-07-01 ENCOUNTER — OFFICE VISIT (OUTPATIENT)
Dept: CARDIOLOGY | Facility: CLINIC | Age: 47
End: 2022-07-01

## 2022-07-01 VITALS
SYSTOLIC BLOOD PRESSURE: 118 MMHG | WEIGHT: 210 LBS | TEMPERATURE: 97.7 F | DIASTOLIC BLOOD PRESSURE: 68 MMHG | BODY MASS INDEX: 32.96 KG/M2 | HEART RATE: 83 BPM | OXYGEN SATURATION: 97 % | HEIGHT: 67 IN

## 2022-07-01 DIAGNOSIS — E11.649 TYPE 2 DIABETES MELLITUS WITH HYPOGLYCEMIA WITHOUT COMA, WITHOUT LONG-TERM CURRENT USE OF INSULIN: ICD-10-CM

## 2022-07-01 DIAGNOSIS — I10 ESSENTIAL HYPERTENSION: ICD-10-CM

## 2022-07-01 DIAGNOSIS — E78.2 MIXED HYPERLIPIDEMIA: ICD-10-CM

## 2022-07-01 DIAGNOSIS — I25.10 CORONARY ARTERY DISEASE INVOLVING NATIVE CORONARY ARTERY OF NATIVE HEART WITHOUT ANGINA PECTORIS: ICD-10-CM

## 2022-07-01 DIAGNOSIS — I25.10 CORONARY ARTERY DISEASE INVOLVING NATIVE CORONARY ARTERY OF NATIVE HEART WITHOUT ANGINA PECTORIS: Primary | ICD-10-CM

## 2022-07-01 LAB
ALBUMIN SERPL-MCNC: 4 G/DL (ref 3.5–5.2)
ALBUMIN/GLOB SERPL: 1.9 G/DL
ALP SERPL-CCNC: 122 U/L (ref 39–117)
ALT SERPL W P-5'-P-CCNC: 24 U/L (ref 1–41)
ANION GAP SERPL CALCULATED.3IONS-SCNC: 12 MMOL/L (ref 5–15)
ARTICHOKE IGE QN: 50 MG/DL (ref 0–100)
AST SERPL-CCNC: 28 U/L (ref 1–40)
BASOPHILS # BLD AUTO: 0.12 10*3/MM3 (ref 0–0.2)
BASOPHILS NFR BLD AUTO: 1.3 % (ref 0–1.5)
BILIRUB SERPL-MCNC: 0.2 MG/DL (ref 0–1.2)
BUN SERPL-MCNC: 15 MG/DL (ref 6–20)
BUN/CREAT SERPL: 42.9 (ref 7–25)
CALCIUM SPEC-SCNC: 8.9 MG/DL (ref 8.6–10.5)
CHLORIDE SERPL-SCNC: 101 MMOL/L (ref 98–107)
CHOLEST SERPL-MCNC: 237 MG/DL (ref 0–200)
CO2 SERPL-SCNC: 24 MMOL/L (ref 22–29)
CREAT SERPL-MCNC: 0.35 MG/DL (ref 0.76–1.27)
DEPRECATED RDW RBC AUTO: 44.3 FL (ref 37–54)
EGFRCR SERPLBLD CKD-EPI 2021: 139.3 ML/MIN/1.73
EOSINOPHIL # BLD AUTO: 0.3 10*3/MM3 (ref 0–0.4)
EOSINOPHIL NFR BLD AUTO: 3.3 % (ref 0.3–6.2)
ERYTHROCYTE [DISTWIDTH] IN BLOOD BY AUTOMATED COUNT: 13.8 % (ref 12.3–15.4)
GLOBULIN UR ELPH-MCNC: 2.1 GM/DL
GLUCOSE SERPL-MCNC: 261 MG/DL (ref 65–99)
HBA1C MFR BLD: 10.1 % (ref 4.8–5.6)
HCT VFR BLD AUTO: 47.7 % (ref 37.5–51)
HDLC SERPL-MCNC: 18 MG/DL (ref 40–60)
HGB BLD-MCNC: 16.3 G/DL (ref 13–17.7)
IMM GRANULOCYTES # BLD AUTO: 0.05 10*3/MM3 (ref 0–0.05)
IMM GRANULOCYTES NFR BLD AUTO: 0.6 % (ref 0–0.5)
LDLC SERPL CALC-MCNC: ABNORMAL MG/DL
LDLC/HDLC SERPL: ABNORMAL {RATIO}
LYMPHOCYTES # BLD AUTO: 3.13 10*3/MM3 (ref 0.7–3.1)
LYMPHOCYTES NFR BLD AUTO: 34.5 % (ref 19.6–45.3)
MCH RBC QN AUTO: 30.6 PG (ref 26.6–33)
MCHC RBC AUTO-ENTMCNC: 34.2 G/DL (ref 31.5–35.7)
MCV RBC AUTO: 89.7 FL (ref 79–97)
MONOCYTES # BLD AUTO: 0.74 10*3/MM3 (ref 0.1–0.9)
MONOCYTES NFR BLD AUTO: 8.1 % (ref 5–12)
NEUTROPHILS NFR BLD AUTO: 4.74 10*3/MM3 (ref 1.7–7)
NEUTROPHILS NFR BLD AUTO: 52.2 % (ref 42.7–76)
NRBC BLD AUTO-RTO: 0 /100 WBC (ref 0–0.2)
PLATELET # BLD AUTO: 169 10*3/MM3 (ref 140–450)
PMV BLD AUTO: 12.3 FL (ref 6–12)
POTASSIUM SERPL-SCNC: 4.6 MMOL/L (ref 3.5–5.2)
PROT SERPL-MCNC: 6.1 G/DL (ref 6–8.5)
RBC # BLD AUTO: 5.32 10*6/MM3 (ref 4.14–5.8)
SODIUM SERPL-SCNC: 137 MMOL/L (ref 136–145)
TRIGL SERPL-MCNC: 863 MG/DL (ref 0–150)
VLDLC SERPL-MCNC: ABNORMAL MG/DL
WBC NRBC COR # BLD: 9.08 10*3/MM3 (ref 3.4–10.8)

## 2022-07-01 PROCEDURE — 36415 COLL VENOUS BLD VENIPUNCTURE: CPT | Performed by: INTERNAL MEDICINE

## 2022-07-01 PROCEDURE — 99214 OFFICE O/P EST MOD 30 MIN: CPT | Performed by: INTERNAL MEDICINE

## 2022-07-01 PROCEDURE — 83721 ASSAY OF BLOOD LIPOPROTEIN: CPT | Performed by: INTERNAL MEDICINE

## 2022-07-01 PROCEDURE — 85025 COMPLETE CBC W/AUTO DIFF WBC: CPT | Performed by: INTERNAL MEDICINE

## 2022-07-01 PROCEDURE — 83036 HEMOGLOBIN GLYCOSYLATED A1C: CPT | Performed by: INTERNAL MEDICINE

## 2022-07-01 PROCEDURE — 80061 LIPID PANEL: CPT

## 2022-07-01 PROCEDURE — 93000 ELECTROCARDIOGRAM COMPLETE: CPT | Performed by: INTERNAL MEDICINE

## 2022-07-01 PROCEDURE — 80053 COMPREHEN METABOLIC PANEL: CPT | Performed by: INTERNAL MEDICINE

## 2022-07-01 RX ORDER — LISINOPRIL 10 MG/1
1 TABLET ORAL DAILY
COMMUNITY
Start: 2022-01-17

## 2022-07-01 NOTE — PROGRESS NOTES
Yemi Fraga  47 y.o. male      1. Coronary artery disease involving native coronary artery of native heart without angina pectoris    2. Essential hypertension    3. Mixed hyperlipidemia        History of Present Illness:  Mr. Fraga is a 47-year-old  male with known coronary artery disease and back in January 2014 underwent PCI to right coronary artery in an acute MI setting in [place], [place].  He had to LIMA placed at that time.  He was noted to have noncritical disease in the left anterior descending coronary artery, ramus intermedius coronary artery.  His risk factors for coronary artery disease include long-standing tobacco abuse, diabetes mellitus, hypertension, hyperlipidemia.  There is no significant family history of CAD.    Echocardiogram showed: (10/19)  · The left ventricular cavity is borderline dilated.  · Left ventricular wall thickness is consistent with mild-to-moderate concentric hypertrophy.  · Estimated EF = 48%. Mild Inferior wall hypokinesis  · Left ventricular systolic function is low normal.  · Left ventricular diastolic dysfunction (grade I) consistent with impaired relaxation.     Exercise Cardiolite stress test showed:(10/19)  · Patient exercised for 7 minutes and 15 seconds of the Pastor protocol to achieve a maximum workload of 8.( METS). No chest pain reported during the test. No ST-T wave changes diagnostic of ischemia. Accelerated blood pressure response. Patient achieved 93% of maximal age-predicted heart rate.  · Left ventricular ejection fraction is mildly reduced (Calculated EF = 45%).  · Myocardial perfusion imaging indicates a small-to-medium-sized infarct located in the inferior wall and lateral wall with no significant ischemia noted.    EKG today showed sinus rhythm with heart rate of 80 bpm.  Rightward axis.  Old inferior wall myocardial infarction.    The patient denies any chest pain or shortness of breath and I note that his dose of atorvastatin was increased to  "80 mg daily since his total cholesterol was 248.  His triglycerides were 656.  The patient claims to be watching his diet but this is not reflected in his lab results.  He unfortunately continues to smoke and we once again had a discussion about smoking cessation.  He is unlikely to quit.  Clinical exam today was unremarkable.  No signs of congestive heart failure was noted.      No Known Allergies      Past Medical History:   Diagnosis Date   • Diabetes mellitus (HCC)    • Hyperlipidemia    • Hypertension    • Myocardial infarction (HCC)     2014         Past Surgical History:   Procedure Laterality Date   • CARDIAC CATHETERIZATION      stent x 2   • INCISION AND DRAINAGE ABSCESS N/A 9/27/2019    Procedure: EXCISION of abdominal wall CYST;  Surgeon: Khris Crum MD;  Location: [facility];  Service: General         History reviewed. No pertinent family history.      Social History     Socioeconomic History   • Marital status:    Tobacco Use   • Smoking status: Current Every Day Smoker     Packs/day: 2.00     Years: 30.00     Pack years: 60.00   • Smokeless tobacco: Never Used   Substance and Sexual Activity   • Alcohol use: No   • Drug use: No         Current Outpatient Medications   Medication Sig Dispense Refill   • aspirin 81 MG EC tablet Take 81 mg by mouth Daily.     • atorvastatin (LIPITOR) 80 MG tablet Take 1 tablet by mouth Every Night. 90 tablet 3   • clopidogrel (PLAVIX) 75 MG tablet Take 75 mg by mouth Daily.     • lisinopril (PRINIVIL,ZESTRIL) 10 MG tablet Take 1 tablet by mouth Daily.     • metFORMIN ER (GLUCOPHAGE-XR) 500 MG 24 hr tablet Take 500 mg by mouth Daily With Breakfast.     • ONETOUCH DELICA LANCETS 33G misc        No current facility-administered medications for this visit.         OBJECTIVE    /68 (BP Location: Left arm, Patient Position: Sitting, Cuff Size: Adult)   Pulse 83   Temp 97.7 °F (36.5 °C)   Ht 170.2 cm (67\")   Wt 95.3 kg (210 lb)   SpO2 97%   BMI " 32.89 kg/m²         Review of Systems     Constitutional:  Denies recent weight loss, weight gain, fever or chills, no change in exercise tolerance     HENT:  Denies any hearing loss, epistaxis, hoarseness, or difficulty speaking.     Eyes: Wears eyeglasses or contact lenses     Respiratory: Occasional dyspnea with exertion,no cough, wheezing, or hemoptysis.     Cardiovascular: Negative for palpations, chest pain, orthopnea, PND, peripheral edema, syncope, or claudication.     Gastrointestinal:  Denies change in bowel habits, dyspepsia, ulcer disease, hematochezia, or melena.     Endocrine: Negative for cold intolerance, heat intolerance, polydipsia, polyphagia and polyuria. Denies any history of weight change, heat/cold intolerance, polydipsia, polyuria     Genitourinary: Negative.      Musculoskeletal: Denies any history of arthritic symptoms or back problems     Skin:  Denies any change in hair or nails, rashes, or skin lesions. Recent surgery for infected sebaceous cyst    Allergic/Immunologic: Negative.  Negative for environmental allergies, food allergies and immunocompromised state.     Neurological:  Denies any history of recurrent headaches, strokes, TIA, or seizure disorder.     Hematological: Denies any food allergies, seasonal allergies, bleeding disorders, or lymphadenopathy.     Psychiatric/Behavioral: Denies any history of depression, substance abuse, or change in cognitive function.     Physical Exam      Constitutional: Cooperative, alert and oriented,  in no acute distress.      HENT:   Head: Normocephalic, normal hair patterns, no masses or tenderness.  Ears, Nose, and Throat: No gross abnormalities. No pallor or cyanosis.   Eyes: EOMS intact, PERRL, conjunctivae and lids unremarkable. Fundoscopic exam and visual fields not performed.   Neck: No palpable masses or adenopathy, no thyromegaly, no JVD, carotid pulses are full and equal bilaterally and without  Bruits.      Cardiovascular: Regular  rhythm, S1 and S2 normal, no S3 or S4.No murmurs, gallops, or rubs detected.      Pulmonary/Chest: Chest: normal symmetry, normal respiratory excursion, no intercostal retraction, no use of accessory muscles.                                  Pulmonary: Normal breath sounds. No rales or ronchi.     Abdominal: Abdomen soft, bowel sounds normoactive, no masses, no hepatosplenomegaly, non-tender, no bruits.     Musculoskeletal: No deformities, clubbing, cyanosis, erythema, or edema observed. There are no spinal abnormalities noted. Normal muscle strength and tone. Pulses full and equal in all extremities, no bruits auscultated.     Neurological: No gross motor or sensory deficits noted, affect appropriate, oriented to time, person, place.      Skin: Warm and dry to the touch, no apparent skin lesions or masses noted.     Psychiatric: He has a normal mood and affect. His behavior is normal. Judgment and thought content normal.           Lab Results   Component Value Date    WBC 10.4 09/16/2019    HGB 17 (H) 09/16/2019    HCT 50.8 (H) 09/16/2019    MCV 91 09/16/2019     09/16/2019     Lab Results   Component Value Date    GLUCOSE 158 (H) 09/27/2019    BUN 17 06/23/2021    CREATININE 0.8 06/23/2021    EGFRIFNONA 100 09/27/2019    EGFRIFAFRI 125 06/23/2021    BCR 19.3 09/27/2019    CO2 23 06/23/2021    CALCIUM 9.0 06/23/2021    ALBUMIN 4.0 06/23/2021    AST 14 06/23/2021    ALT 17 06/23/2021     Lab Results   Component Value Date    CHOL 248 (H) 08/24/2020    CHOL 197 09/16/2019    CHOL 372 (H) 10/01/2018     Lab Results   Component Value Date    TRIG 551 (H) 06/23/2021    TRIG 656 (H) 08/24/2020    TRIG 479 (H) 09/16/2019     Lab Results   Component Value Date    HDL 18 (L) 06/23/2021    HDL 16 (L) 08/24/2020    HDL 18 (L) 09/16/2019     No components found for: LDLCALC  Lab Results   Component Value Date    LDL 48 06/23/2021    LDL  06/23/2021     Unable to calc due to high Trig  Direct LDL ordered    LDL 65  08/24/2020     No results found for: HDLLDLRATIO  No components found for: CHOLHDL  Lab Results   Component Value Date    HGBA1C 8.4 (H) 06/23/2021     Lab Results   Component Value Date    TSH 2.66 06/23/2021           ASSESSMENT AND PLAN  Mr. Fraga has multiple risk factors for coronary artery disease including hypertension, diabetes mellitus, tobacco abuse and documented coronary artery disease with previous intervention to right coronary artery in an acute MI setting in January 2014.  Antiplatelet therapy with aspirin and Plavix, lipid-lowering therapy with atorvastatin has been continued and I have started him on fenofibrate 145 mg daily for optimization of triglycerides.  I did bring up starting Vascepa which has excellent data but he does not wish to take fish oils.    Aggressive risk factor modification and smoking cessation was stressed.     Diagnoses and all orders for this visit:    1. Coronary artery disease involving native coronary artery of native heart without angina pectoris (Primary)  -     ECG 12 Lead    2. Essential hypertension    3. Mixed hyperlipidemia        Patient's Body mass index is 32.89 kg/m². BMI is above normal parameters. Recommendations include: exercise counseling and nutrition counseling.      Yemi Fraga is a current cigarettes user.  He currently smokes 1 pack of cigarettes per day for a duration of 35 years. I have educated him on the risk of diseases from using tobacco products such as cancer, COPD and heart diease.   Smoking cessation was stressed.  He is unlikely to quit.    Ruben Alvarez MD  7/1/2022  08:54 CDT

## 2022-07-06 LAB
QT INTERVAL: 380 MS
QTC INTERVAL: 446 MS

## 2022-07-06 NOTE — PROGRESS NOTES
Contacted patient to go over results and Patient was aware his lab work was abnormal. He said that he has made a appointment already to see PCP to take the next step to control the elevated levels.    Patient voiced understanding

## 2022-07-18 ENCOUNTER — TELEPHONE (OUTPATIENT)
Dept: CARDIOLOGY | Facility: CLINIC | Age: 47
End: 2022-07-18

## 2022-07-18 NOTE — TELEPHONE ENCOUNTER
Contact patient verified name and  results given. I let patient know that his form are ready to be picked up up at front.     Patient voiced understanding.      ----- Message -----  From: Ruben Alvarez MD  Sent: 2022   8:20 AM CDT  To: Neena Vega MA    Stress test within normal limits

## 2022-07-18 NOTE — TELEPHONE ENCOUNTER
Attempted to contact patient left voice mail for him to contact our office back.     I was just going to let him know that his documents he requested will be up front when he is ready to pick them up.    Thanks

## 2022-09-19 ENCOUNTER — TELEPHONE (OUTPATIENT)
Dept: CARDIOLOGY | Facility: CLINIC | Age: 47
End: 2022-09-19

## 2022-09-19 NOTE — TELEPHONE ENCOUNTER
Patient returned call and informed him that results were faxed. He voiced his understanding.     Returned patient call to inform him that the results have been faxed for him. He was not available so left generic VM for return call.    ----- Message from Lowell Chavez sent at 9/19/2022 10:44 AM CDT -----  Regarding: [facility] patient  Pacheco Fraga called needing his stress test results [facility]ed [facility] getting [facility] physical and they are needing those results. That fax number is [phone] and his return number is [phone]. Thanks.

## (undated) DEVICE — GLV SURG SENSICARE PI LF PF 8 GRN STRL

## (undated) DEVICE — GLV SURG SENSICARE PI PF LF 7 GRN STRL

## (undated) DEVICE — GLV SURG TRIUMPH LT PF LTX 6.5 STRL

## (undated) DEVICE — GOWN,AURORA,NOREINF,RAGLAN,XL,STERILE: Brand: MEDLINE

## (undated) DEVICE — PREP SOL POVIDONE/IODINE BT 4OZ

## (undated) DEVICE — SOL IRR NACL 0.9PCT BT 1000ML

## (undated) DEVICE — SPNG GZ WOVN 4X4IN 12PLY 10/BX STRL

## (undated) DEVICE — GLV SURG SENSICARE POLYISPRN W/ALOE PF LF 6.5 GRN STRL

## (undated) DEVICE — PK MAJ PROC LF 60

## (undated) DEVICE — STERILE POLYISOPRENE POWDER-FREE SURGICAL GLOVES WITH EMOLLIENT COATING: Brand: PROTEXIS

## (undated) DEVICE — TRAP,MUCUS SPECIMEN,40CC: Brand: MEDLINE

## (undated) DEVICE — GLV SURG SENSICARE PI LF PF 7.5 GRN STRL

## (undated) DEVICE — GLV SURG TRIUMPH LT PF LTX 7.5 STRL

## (undated) DEVICE — PREP PVP-I 7.5P BT 4OZ